# Patient Record
Sex: FEMALE | Race: BLACK OR AFRICAN AMERICAN | NOT HISPANIC OR LATINO | Employment: OTHER | URBAN - METROPOLITAN AREA
[De-identification: names, ages, dates, MRNs, and addresses within clinical notes are randomized per-mention and may not be internally consistent; named-entity substitution may affect disease eponyms.]

---

## 2018-02-26 RX ORDER — VALSARTAN AND HYDROCHLOROTHIAZIDE 320; 25 MG/1; MG/1
1 TABLET, FILM COATED ORAL DAILY
Qty: 30 TABLET | Refills: 0 | Status: SHIPPED | OUTPATIENT
Start: 2018-02-26 | End: 2019-02-26

## 2018-07-05 RX ORDER — PANTOPRAZOLE SODIUM 40 MG/1
TABLET, DELAYED RELEASE ORAL
Qty: 30 TABLET | Refills: 3 | Status: SHIPPED | OUTPATIENT
Start: 2018-07-05

## 2018-09-28 ENCOUNTER — OFFICE VISIT (OUTPATIENT)
Dept: HEMATOLOGY/ONCOLOGY | Facility: CLINIC | Age: 63
End: 2018-09-28
Payer: COMMERCIAL

## 2018-09-28 VITALS — WEIGHT: 293 LBS | BODY MASS INDEX: 44.41 KG/M2 | HEIGHT: 68 IN | RESPIRATION RATE: 18 BRPM | TEMPERATURE: 98 F

## 2018-09-28 DIAGNOSIS — I82.401 RECURRENT ACUTE DEEP VEIN THROMBOSIS (DVT) OF RIGHT LOWER EXTREMITY: ICD-10-CM

## 2018-09-28 DIAGNOSIS — D61.818 PANCYTOPENIA, ACQUIRED: ICD-10-CM

## 2018-09-28 DIAGNOSIS — I10 ESSENTIAL HYPERTENSION: ICD-10-CM

## 2018-09-28 DIAGNOSIS — D75.839 THROMBOCYTOSIS: ICD-10-CM

## 2018-09-28 DIAGNOSIS — D61.818 ACQUIRED PANCYTOPENIA: Primary | ICD-10-CM

## 2018-09-28 DIAGNOSIS — G63 POLYNEUROPATHY ASSOCIATED WITH UNDERLYING DISEASE: ICD-10-CM

## 2018-09-28 DIAGNOSIS — M54.41 CHRONIC BILATERAL LOW BACK PAIN WITH BILATERAL SCIATICA: ICD-10-CM

## 2018-09-28 DIAGNOSIS — M54.42 CHRONIC BILATERAL LOW BACK PAIN WITH BILATERAL SCIATICA: ICD-10-CM

## 2018-09-28 DIAGNOSIS — M79.7 FIBROMYALGIA SYNDROME: ICD-10-CM

## 2018-09-28 DIAGNOSIS — G89.29 CHRONIC BILATERAL LOW BACK PAIN WITH BILATERAL SCIATICA: ICD-10-CM

## 2018-09-28 DIAGNOSIS — D68.59 HYPERCOAGULATION SYNDROME: ICD-10-CM

## 2018-09-28 PROCEDURE — 3008F BODY MASS INDEX DOCD: CPT | Mod: ,,, | Performed by: INTERNAL MEDICINE

## 2018-09-28 PROCEDURE — 99204 OFFICE O/P NEW MOD 45 MIN: CPT | Mod: ,,, | Performed by: INTERNAL MEDICINE

## 2018-09-28 RX ORDER — MELOXICAM 15 MG/1
15 TABLET ORAL DAILY
Refills: 3 | COMMUNITY
Start: 2018-09-04

## 2018-09-28 RX ORDER — ACETAMINOPHEN, DEXTROMETHORPHAN HBR, DOXYLAMINE SUCCINATE, PHENYLEPHRINE HCL 650; 20; 12.5; 1 MG/30ML; MG/30ML; MG/30ML; MG/30ML
SOLUTION ORAL
COMMUNITY

## 2018-09-28 RX ORDER — METHOCARBAMOL 500 MG/1
1000 TABLET, FILM COATED ORAL
COMMUNITY
Start: 2018-05-10

## 2018-09-28 RX ORDER — FERROUS SULFATE 325(65) MG
325 TABLET ORAL
COMMUNITY

## 2018-09-28 RX ORDER — GABAPENTIN 400 MG/1
400 CAPSULE ORAL 3 TIMES DAILY
Refills: 5 | COMMUNITY
Start: 2018-09-26

## 2018-09-28 RX ORDER — FUROSEMIDE 20 MG/1
20 TABLET ORAL
COMMUNITY
Start: 2018-09-13 | End: 2019-09-13

## 2018-09-28 RX ORDER — HYDROCODONE BITARTRATE AND ACETAMINOPHEN 10; 325 MG/1; MG/1
1 TABLET ORAL
COMMUNITY

## 2018-09-28 RX ORDER — CARVEDILOL 12.5 MG/1
TABLET ORAL
COMMUNITY
Start: 2017-07-11

## 2018-09-28 RX ORDER — DULOXETIN HYDROCHLORIDE 60 MG/1
60 CAPSULE, DELAYED RELEASE ORAL
COMMUNITY
Start: 2018-08-23

## 2018-09-28 NOTE — LETTER
September 28, 2018      Merry Pena MD  [address] [place] [address]           [facility] - Hematology Oncology  [address] [facility]  [address] [place] [address]  Phone: [phone]  Fax: [phone]          Patient: Mitzy Almonte   MR Number: [id number]   YOB: 1955   Date of Visit: 9/28/2018       Dear Dr. Merry Pena:    Thank you for referring Mitzy Almonte to me for evaluation. Attached you will find relevant portions of my assessment and plan of care.    If you have questions, please do not hesitate to call me. I look forward to following Mitzy Almonte along with you.    Sincerely,    MANUEL Laurent MD    Enclosure  CC:  No Recipients    If you would like to receive this communication electronically, please contact [email or web address] or [phone] to request more information on [employer] Link access.    For providers and/or their staff who would like to refer a patient to [facility], please contact us through our one-stop-shop provider referral line, [facility] [place], at [phone].    If you feel you have received this communication in error or would no longer like to receive these types of communications, please e-mail [email or web address]

## 2018-09-29 PROBLEM — D61.818 PANCYTOPENIA, ACQUIRED: Status: ACTIVE | Noted: 2018-09-29

## 2018-09-30 NOTE — PROGRESS NOTES
[facility] History & Physical    Subjective:      Patient ID:   Mitzy Almonte  63 y.o. female  1955  [place], Kim Martines Lew, Yanez      Chief Complaint:   Anemia, pancytopenia    HPI:  63 y.o. female with anemia and pancytopenia.  No transfusion hx, liver or GB dx.  Anabelle Stevenson, sister, anemia.    Has had episodes of L inguinal pain and vaginal spotting.    Decreased appetite but weight increased.  No GI sx.  She has sleep apnea, but does not use CPAP.  Fibromyalgia +, no RA.  Spinal stenosis with sciatica down both legs, and L & R foot numbness.  Imbalance sx.    No surgery hx.  HTN +.    Menses age 17.  M0  Postmenopausal in early 50's.    Smoke no. ETOH no. Allergy no.  Job [employer] manager.    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  Dad colostomy.  Sister anemia    Sister CHF  Son diverticulitis    EGD, colonoscopy C 17.      ROS:   GEN: normal without any fever, night sweats or weight loss  HEENT: normal with no HA's, sore throat, stiff neck, changes in vision  CV: normal with no CP, SOB, PND, BARNES or orthopnea  PULM:See HPI  GI: normal with no abdominal pain, nausea, vomiting, constipation, diarrhea, melanotic stools, BRBPR, or hematemesis  : normal with no hematuria, dysuria  GYN: See HPI  BREAST: normal with no mass, discharge, pain  SKIN: normal with no rash, erythema, bruising, or swelling    Review of patient's allergies indicates:  No Known Allergies      Current Outpatient Medications:     aspirin-calcium carbonate 81 mg-300 mg calcium(777 mg) Tab, Take 81 mg by mouth., Disp: , Rfl:     carvedilol (COREG) 12.5 MG tablet, carvedilol 12.5 mg tablet  TAKE ONE TABLET BY MOUTH TWICE DAILY, Disp: , Rfl:     cyanocobalamin, vitamin B-12, 1,000 mcg TbSR, Take by mouth., Disp: , Rfl:     DULoxetine (CYMBALTA) 60 MG capsule, Take 60 mg by mouth., Disp: , Rfl:     ferrous sulfate (FEOSOL) 325 mg (65 mg iron) Tab tablet, Take 325 mg by mouth., Disp: , Rfl:     furosemide (LASIX) 20 MG tablet,  "Take 20 mg by mouth., Disp: , Rfl:     gabapentin (NEURONTIN) 400 MG capsule, Take 400 mg by mouth 3 (three) times daily., Disp: , Rfl: 5    glucosamine sulfate (SYNOVACIN ORAL), Glucosamine, Disp: , Rfl:     HYDROcodone-acetaminophen (NORCO)  mg per tablet, Take 1 tablet by mouth., Disp: , Rfl:     meloxicam (MOBIC) 15 MG tablet, Take 15 mg by mouth once daily., Disp: , Rfl: 3    methocarbamol (ROBAXIN) 500 MG Tab, Take 1,000 mg by mouth., Disp: , Rfl:     pantoprazole (PROTONIX) 40 MG tablet, TAKE ONE TABLET BY MOUTH EVERY DAY, Disp: 30 tablet, Rfl: 3    valsartan-hydrochlorothiazide (DIOVAN-HCT) 320-25 mg per tablet, Take 1 tablet by mouth once daily., Disp: 30 tablet, Rfl: 0          Objective:   Vitals:  Temperature 97.9 °F (36.6 °C), resp. rate 18, height 5' 8" (1.727 m), weight (!) 146.5 kg (323 lb).    Physical Examination:   GEN: no apparent distress, comfortable  HEAD: atraumatic and normocephalic  EYES: no pallor, no icterus  ENT:  no pharyngeal erythema, external ears WNL; no nasal discharge  NECK: no masses, thyroid normal, trachea midline, no LAD/LN's, supple  CV: RRR with no murmur; normal pulse; normal S1 and S2  CHEST: Normal respiratory effort; CTAB; normal breath sounds; no wheeze or crackles  ABDOM: nontender and nondistended; soft; normal bowel sounds; no rebound/guarding, L/S NP  MUSC/Skeletal: ROM normal; no crepitus; joints normal  EXTREM: no clubbing, cyanosis, inflammation or swelling  SKIN: no rashes, lesions, ulcers, petechiae   : no CVAT  NEURO: grossly intact; motor/sensory WNL;  no tremors  PSYCH: normal mood, affect and behavior  LYMPH: normal cervical, supraclavicular, axillary and groin LN's  BREASTS: L & R NT, no palpable mass      Labs:     H/H 10.3/32.2  WBC 3,600.  MCV 75  Creat 1.04  Plt cnt 118,000.  Folate 10.3  Ferritin 10  B    Assessment:   (1) 63 y.o. female with diagnosis of  Anemia and pancytopenia.  Lab ordered for [facility].  Check for " deficiencies of Fe, Vitamen, check for renal dx and myeloma.  Check for PNH.    (2)RTC 2-3 weeks, [place] office.           Follow-up in about 3 weeks (around 10/19/2018) for check of blood status after therapy.

## 2018-10-25 ENCOUNTER — OFFICE VISIT (OUTPATIENT)
Dept: HEMATOLOGY/ONCOLOGY | Facility: CLINIC | Age: 63
End: 2018-10-25
Payer: COMMERCIAL

## 2018-10-25 VITALS
BODY MASS INDEX: 43.4 KG/M2 | HEART RATE: 65 BPM | HEIGHT: 69 IN | WEIGHT: 293 LBS | DIASTOLIC BLOOD PRESSURE: 86 MMHG | SYSTOLIC BLOOD PRESSURE: 151 MMHG | TEMPERATURE: 98 F

## 2018-10-25 DIAGNOSIS — D50.9 MICROCYTIC NORMOCHROMIC ANEMIA: Primary | ICD-10-CM

## 2018-10-25 PROCEDURE — 3008F BODY MASS INDEX DOCD: CPT | Mod: ,,, | Performed by: INTERNAL MEDICINE

## 2018-10-25 PROCEDURE — 99213 OFFICE O/P EST LOW 20 MIN: CPT | Mod: ,,, | Performed by: INTERNAL MEDICINE

## 2018-10-25 NOTE — LETTER
October 27, 2018      Merry Pena MD  [address]  [place] [address]           [facility] Hematology Oncology  [address] [address]  [place] [address]  Phone: [phone]  Fax: [phone]          Patient: Mitzy Almonte   MR Number: <[id number]>   YOB: 1955   Date of Visit: 10/25/2018       Dear Dr. Merry Pena:    Thank you for referring Mitzy Almonte to me for evaluation. Attached you will find relevant portions of my assessment and plan of care.    If you have questions, please do not hesitate to call me. I look forward to following Mitzy Almonte along with you.    Sincerely,        Enclosure  CC:  No Recipients    If you would like to receive this communication electronically, please contact [email or web address] or [phone] to request more information on [employer] Link access.    For providers and/or their staff who would like to refer a patient to [facility], please contact us through our one-stop-shop provider referral line, [facility], at [phone].    If you feel you have received this communication in error or would no longer like to receive these types of communications, please e-mail [email or web address]

## 2018-10-25 NOTE — PATIENT INSTRUCTIONS

## 2018-10-29 NOTE — PROGRESS NOTES
[facility] History & Physical    Subjective:      Patient ID:   Mitzy Almonte  63 y.o. female  1955  [place], Kim Martines Lew, Yanez      Chief Complaint:   Anemia, pancytopenia    HPI:  63 y.o. female with anemia and pancytopenia.  No transfusion hx, liver or GB dx.  Anabelle Stevenson, sister, anemia.    Has had episodes of L inguinal pain and vaginal spotting.    Decreased appetite but weight increased.  No GI sx.  She has sleep apnea, but does not use CPAP.  Fibromyalgia +, no RA.  Spinal stenosis with sciatica down both legs, and L & R foot numbness.  Imbalance sx.    No surgery hx.  HTN +.    Menses age 17.  M0  Postmenopausal in early 50's.    Smoke no. ETOH no. Allergy no.  Job [employer] manager.    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  Dad colostomy.  Sister anemia    Sister CHF  Son diverticulitis    EGD, colonoscopy C 17.      ROS:   GEN: normal without any fever, night sweats or weight loss  HEENT: normal with no HA's, sore throat, stiff neck, changes in vision  CV: normal with no CP, SOB, PND, BARNES or orthopnea  PULM:See HPI  GI: normal with no abdominal pain, nausea, vomiting, constipation, diarrhea, melanotic stools, BRBPR, or hematemesis  : normal with no hematuria, dysuria  GYN: See HPI  BREAST: normal with no mass, discharge, pain  SKIN: normal with no rash, erythema, bruising, or swelling    Review of patient's allergies indicates:  No Known Allergies      Current Outpatient Medications:     aspirin-calcium carbonate 81 mg-300 mg calcium(777 mg) Tab, Take 81 mg by mouth., Disp: , Rfl:     carvedilol (COREG) 12.5 MG tablet, carvedilol 12.5 mg tablet  TAKE ONE TABLET BY MOUTH TWICE DAILY, Disp: , Rfl:     cyanocobalamin, vitamin B-12, 1,000 mcg TbSR, Take by mouth., Disp: , Rfl:     DULoxetine (CYMBALTA) 60 MG capsule, Take 60 mg by mouth., Disp: , Rfl:     ferrous sulfate (FEOSOL) 325 mg (65 mg iron) Tab tablet, Take 325 mg by mouth., Disp: , Rfl:     furosemide (LASIX) 20 MG tablet,  "Take 20 mg by mouth., Disp: , Rfl:     gabapentin (NEURONTIN) 400 MG capsule, Take 400 mg by mouth 3 (three) times daily., Disp: , Rfl: 5    glucosamine sulfate (SYNOVACIN ORAL), Glucosamine, Disp: , Rfl:     HYDROcodone-acetaminophen (NORCO)  mg per tablet, Take 1 tablet by mouth., Disp: , Rfl:     meloxicam (MOBIC) 15 MG tablet, Take 15 mg by mouth once daily., Disp: , Rfl: 3    methocarbamol (ROBAXIN) 500 MG Tab, Take 1,000 mg by mouth., Disp: , Rfl:     pantoprazole (PROTONIX) 40 MG tablet, TAKE ONE TABLET BY MOUTH EVERY DAY, Disp: 30 tablet, Rfl: 3    valsartan-hydrochlorothiazide (DIOVAN-HCT) 320-25 mg per tablet, Take 1 tablet by mouth once daily., Disp: 30 tablet, Rfl: 0          Objective:   Vitals:  Blood pressure (!) 151/86, pulse 65, temperature 97.6 °F (36.4 °C), height 5' 9" (1.753 m), weight (!) 147.4 kg (325 lb).    Physical Examination:   GEN: no apparent distress, comfortable  HEAD: atraumatic and normocephalic  EYES: no pallor, no icterus  ENT:  no pharyngeal erythema, external ears WNL; no nasal discharge  NECK: no masses, thyroid normal, trachea midline, no LAD/LN's, supple  CV: RRR with no murmur; normal pulse; normal S1 and S2  CHEST: Normal respiratory effort; CTAB; normal breath sounds; no wheeze or crackles  ABDOM: nontender and nondistended; soft; normal bowel sounds; no rebound/guarding, L/S NP  MUSC/Skeletal: ROM normal; no crepitus; joints normal  EXTREM: no clubbing, cyanosis, inflammation or swelling  SKIN: no rashes, lesions, ulcers, petechiae   : no CVAT  NEURO: grossly intact; motor/sensory WNL;  no tremors  PSYCH: normal mood, affect and behavior  LYMPH: normal cervical, supraclavicular, axillary and groin LN's  BREASTS: L & R NT, no palpable mass      Labs:     H/H 10.3/32.2  WBC 3,600.  MCV 75  Creat 1.04  Plt cnt 118,000.  Folate 10.3  Ferritin 10  B    Assessment:   (1) 63 y.o. female with diagnosis of  Anemia and pancytopenia.  Lab ordered [facility].  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  Hgb10.5.  Start procrit if HGB < 10.    (2)RTC 6 months, with CBC   [place] office.           Follow-up in about 6 months (around 4/25/2019) for check of blood status after therapy.

## 2018-11-05 RX ORDER — CARVEDILOL 12.5 MG/1
TABLET ORAL
Qty: 180 TABLET | OUTPATIENT
Start: 2018-11-05

## 2018-12-10 RX ORDER — CARVEDILOL 12.5 MG/1
TABLET ORAL
Qty: 180 TABLET | OUTPATIENT
Start: 2018-12-10

## 2019-04-25 ENCOUNTER — OFFICE VISIT (OUTPATIENT)
Dept: HEMATOLOGY/ONCOLOGY | Facility: CLINIC | Age: 64
End: 2019-04-25
Payer: COMMERCIAL

## 2019-04-25 VITALS
DIASTOLIC BLOOD PRESSURE: 80 MMHG | HEART RATE: 61 BPM | BODY MASS INDEX: 47.85 KG/M2 | RESPIRATION RATE: 20 BRPM | WEIGHT: 293 LBS | TEMPERATURE: 98 F | SYSTOLIC BLOOD PRESSURE: 159 MMHG

## 2019-04-25 DIAGNOSIS — D50.9 MICROCYTIC NORMOCHROMIC ANEMIA: Primary | ICD-10-CM

## 2019-04-25 PROCEDURE — 3008F PR BODY MASS INDEX (BMI) DOCUMENTED: ICD-10-PCS | Mod: ,,, | Performed by: INTERNAL MEDICINE

## 2019-04-25 PROCEDURE — 3079F PR MOST RECENT DIASTOLIC BLOOD PRESSURE 80-89 MM HG: ICD-10-PCS | Mod: ,,, | Performed by: INTERNAL MEDICINE

## 2019-04-25 PROCEDURE — 3077F SYST BP >= 140 MM HG: CPT | Mod: ,,, | Performed by: INTERNAL MEDICINE

## 2019-04-25 PROCEDURE — 3077F PR MOST RECENT SYSTOLIC BLOOD PRESSURE >= 140 MM HG: ICD-10-PCS | Mod: ,,, | Performed by: INTERNAL MEDICINE

## 2019-04-25 PROCEDURE — 3079F DIAST BP 80-89 MM HG: CPT | Mod: ,,, | Performed by: INTERNAL MEDICINE

## 2019-04-25 PROCEDURE — 99214 OFFICE O/P EST MOD 30 MIN: CPT | Mod: ,,, | Performed by: INTERNAL MEDICINE

## 2019-04-25 PROCEDURE — 3008F BODY MASS INDEX DOCD: CPT | Mod: ,,, | Performed by: INTERNAL MEDICINE

## 2019-04-25 PROCEDURE — 99214 PR OFFICE/OUTPT VISIT, EST, LEVL IV, 30-39 MIN: ICD-10-PCS | Mod: ,,, | Performed by: INTERNAL MEDICINE

## 2019-04-25 NOTE — LETTER
April 27, 2019      Merry Pena MD  [address]  [place] [address]           [facility]  [address]  [place] [address]  Phone: [phone]  Fax: [phone]          Patient: Mitzy Almonte   MR Number: [id number]   YOB: 1955   Date of Visit: 4/25/2019       Dear Dr. Merry Pena:    Thank you for referring Mitzy Almonte to me for evaluation. Attached you will find relevant portions of my assessment and plan of care.    If you have questions, please do not hesitate to call me. I look forward to following Mitzy Almonte along with you.    Sincerely,    MANUEL Laurent MD    Enclosure  CC:  No Recipients    If you would like to receive this communication electronically, please contact [email or web address] or [phone] to request more information on [employer] Link access.    For providers and/or their staff who would like to refer a patient to [facility], please contact us through our one-stop-shop provider referral line, [facility] [place], at [phone].    If you feel you have received this communication in error or would no longer like to receive these types of communications, please e-mail [email or web address]

## 2019-04-28 NOTE — PROGRESS NOTES
[facility] History & Physical    Subjective:      Patient ID:   Mitzy Almonte  64 y.o. female  1955  [place], Kim Martines Lew, Yanez      Chief Complaint:   Anemia, pancytopenia    HPI:  64 y.o. female with anemia and pancytopenia.  No transfusion hx, liver or GB dx.  Anabelle Stevenson, sister, anemia.    Has had episodes of L inguinal pain and vaginal spotting.    Decreased appetite but weight increased.  No GI sx.  She has sleep apnea, but does not use CPAP.  Fibromyalgia +, no RA.  Spinal stenosis with sciatica down both legs, and L & R foot numbness.  Imbalance sx.    No surgery hx.  HTN +.    Menses age 17.  M0  Postmenopausal in early 50's.    Smoke no. ETOH no. Allergy no.  Job [employer] manager.    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  Dad colostomy.  Sister anemia    Sister CHF  Son diverticulitis    EGD, colonoscopy C 17.      ROS:   GEN: normal without any fever, night sweats or weight loss  HEENT: normal with no HA's, sore throat, stiff neck, changes in vision  CV: normal with no CP, SOB, PND, BARNES or orthopnea  PULM:See HPI  GI: normal with no abdominal pain, nausea, vomiting, constipation, diarrhea, melanotic stools, BRBPR, or hematemesis  : normal with no hematuria, dysuria  GYN: See HPI  BREAST: normal with no mass, discharge, pain  SKIN: normal with no rash, erythema, bruising, or swelling    Review of patient's allergies indicates:  No Known Allergies      Current Outpatient Medications:     aspirin-calcium carbonate 81 mg-300 mg calcium(777 mg) Tab, Take 81 mg by mouth., Disp: , Rfl:     carvedilol (COREG) 12.5 MG tablet, carvedilol 12.5 mg tablet  TAKE ONE TABLET BY MOUTH TWICE DAILY, Disp: , Rfl:     cyanocobalamin, vitamin B-12, 1,000 mcg TbSR, Take by mouth., Disp: , Rfl:     DULoxetine (CYMBALTA) 60 MG capsule, Take 60 mg by mouth., Disp: , Rfl:     ferrous sulfate (FEOSOL) 325 mg (65 mg iron) Tab tablet, Take 325 mg by mouth., Disp: , Rfl:     furosemide (LASIX) 20 MG tablet,  Take 20 mg by mouth., Disp: , Rfl:     gabapentin (NEURONTIN) 400 MG capsule, Take 400 mg by mouth 3 (three) times daily., Disp: , Rfl: 5    glucosamine sulfate (SYNOVACIN ORAL), Glucosamine, Disp: , Rfl:     HYDROcodone-acetaminophen (NORCO)  mg per tablet, Take 1 tablet by mouth., Disp: , Rfl:     meloxicam (MOBIC) 15 MG tablet, Take 15 mg by mouth once daily., Disp: , Rfl: 3    methocarbamol (ROBAXIN) 500 MG Tab, Take 1,000 mg by mouth., Disp: , Rfl:     pantoprazole (PROTONIX) 40 MG tablet, TAKE ONE TABLET BY MOUTH EVERY DAY, Disp: 30 tablet, Rfl: 3    valsartan-hydrochlorothiazide (DIOVAN-HCT) 320-25 mg per tablet, Take 1 tablet by mouth once daily., Disp: 30 tablet, Rfl: 0          Objective:   Vitals:  Blood pressure (!) 159/80, pulse 61, temperature 98 °F (36.7 °C), resp. rate 20, weight (!) 147 kg (324 lb).    Physical Examination:   GEN: no apparent distress, comfortable  HEAD: atraumatic and normocephalic  EYES: no pallor, no icterus  ENT:  no pharyngeal erythema, external ears WNL; no nasal discharge  NECK: no masses, thyroid normal, trachea midline, no LAD/LN's, supple  CV: RRR with no murmur; normal pulse; normal S1 and S2  CHEST: Normal respiratory effort; CTAB; normal breath sounds; no wheeze or crackles  ABDOM: nontender and nondistended; soft, no rebound/guarding, L/S NP  MUSC/Skeletal: ROM normal; no crepitus; joints normal  EXTREM: no clubbing, cyanosis, inflammation or swelling  SKIN: no rashes, lesions, ulcers, petechiae   : no CVAT  NEURO: grossly intact; motor/sensory WNL;  no tremors  PSYCH: normal mood, affect and behavior  LYMPH: normal cervical, supraclavicular, axillary and groin LN's  BREASTS: L & R NT, no palpable mass      Labs:     H/H 9.8, MCV 77.      Assessment:   (1) 64 y.o. female with diagnosis of  Anemia and pancytopenia.    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  Hgb10.5.  Start procrit if HGB < 10.    (2)RTC 4 months, with CBC   [place] office.

## 2019-08-15 ENCOUNTER — OFFICE VISIT (OUTPATIENT)
Dept: HEMATOLOGY/ONCOLOGY | Facility: CLINIC | Age: 64
End: 2019-08-15
Payer: COMMERCIAL

## 2019-08-15 VITALS
OXYGEN SATURATION: 97 % | HEART RATE: 67 BPM | DIASTOLIC BLOOD PRESSURE: 76 MMHG | SYSTOLIC BLOOD PRESSURE: 125 MMHG | TEMPERATURE: 98 F

## 2019-08-15 DIAGNOSIS — G89.29 CHRONIC BILATERAL LOW BACK PAIN WITH BILATERAL SCIATICA: ICD-10-CM

## 2019-08-15 DIAGNOSIS — D61.818 PANCYTOPENIA, ACQUIRED: Primary | ICD-10-CM

## 2019-08-15 DIAGNOSIS — G63 POLYNEUROPATHY ASSOCIATED WITH UNDERLYING DISEASE: ICD-10-CM

## 2019-08-15 DIAGNOSIS — M79.7 FIBROMYALGIA SYNDROME: ICD-10-CM

## 2019-08-15 DIAGNOSIS — M54.42 CHRONIC BILATERAL LOW BACK PAIN WITH BILATERAL SCIATICA: ICD-10-CM

## 2019-08-15 DIAGNOSIS — M54.41 CHRONIC BILATERAL LOW BACK PAIN WITH BILATERAL SCIATICA: ICD-10-CM

## 2019-08-15 DIAGNOSIS — D50.9 MICROCYTIC NORMOCHROMIC ANEMIA: ICD-10-CM

## 2019-08-15 PROCEDURE — 99214 OFFICE O/P EST MOD 30 MIN: CPT | Mod: S$GLB,,, | Performed by: INTERNAL MEDICINE

## 2019-08-15 PROCEDURE — 99214 PR OFFICE/OUTPT VISIT, EST, LEVL IV, 30-39 MIN: ICD-10-PCS | Mod: S$GLB,,, | Performed by: INTERNAL MEDICINE

## 2019-08-20 NOTE — PROGRESS NOTES
[facility] History & Physical    Subjective:      Patient ID:   Mitzy Almotne  64 y.o. female  1955  [place], Kim Martines Lew, [place]      Chief Complaint:   Anemia, pancytopenia    HPI:  64 y.o. female with anemia and pancytopenia.  No transfusion hx, liver or GB dx.  Anabelle Stevenson, sister, anemia.    Has had episodes of L inguinal pain and vaginal spotting.    Decreased appetite but weight increased.  No GI sx.  She has sleep apnea, but does not use CPAP.  Fibromyalgia +, no RA.  Spinal stenosis with sciatica down both legs, and L & R foot numbness.  Imbalance sx.    No surgery hx.  HTN +.    Menses age 17.  M0  Postmenopausal in early 50's.  Admits to frequency, incontinence.    Smoke no. ETOH no. Allergy no.  Job [employer] manager.    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  Dad colostomy.  Sister anemia    Sister CHF  Son diverticulitis    EGD, colonoscopy C 17.      ROS:   GEN: normal without any fever, night sweats or weight loss  HEENT: normal with no HA's, sore throat, stiff neck, changes in vision  CV: normal with no CP, SOB, PND, BARNES or orthopnea  PULM:See HPI  GI: normal with no abdominal pain, nausea, vomiting, constipation, diarrhea, melanotic stools, BRBPR, or hematemesis  : normal with no hematuria, dysuria  GYN: See HPI  BREAST: normal with no mass, discharge, pain  SKIN: normal with no rash, erythema, bruising, or swelling    Review of patient's allergies indicates:  No Known Allergies      Current Outpatient Medications:     aspirin-calcium carbonate 81 mg-300 mg calcium(777 mg) Tab, Take 81 mg by mouth., Disp: , Rfl:     carvedilol (COREG) 12.5 MG tablet, carvedilol 12.5 mg tablet  TAKE ONE TABLET BY MOUTH TWICE DAILY, Disp: , Rfl:     cyanocobalamin, vitamin B-12, 1,000 mcg TbSR, Take by mouth., Disp: , Rfl:     DULoxetine (CYMBALTA) 60 MG capsule, Take 60 mg by mouth., Disp: , Rfl:     ferrous sulfate (FEOSOL) 325 mg (65 mg iron) Tab tablet, Take 325 mg by mouth., Disp: , Rfl:      furosemide (LASIX) 20 MG tablet, Take 20 mg by mouth., Disp: , Rfl:     gabapentin (NEURONTIN) 400 MG capsule, Take 400 mg by mouth 3 (three) times daily., Disp: , Rfl: 5    glucosamine sulfate (SYNOVACIN ORAL), Glucosamine, Disp: , Rfl:     HYDROcodone-acetaminophen (NORCO)  mg per tablet, Take 1 tablet by mouth., Disp: , Rfl:     meloxicam (MOBIC) 15 MG tablet, Take 15 mg by mouth once daily., Disp: , Rfl: 3    methocarbamol (ROBAXIN) 500 MG Tab, Take 1,000 mg by mouth., Disp: , Rfl:     pantoprazole (PROTONIX) 40 MG tablet, TAKE ONE TABLET BY MOUTH EVERY DAY, Disp: 30 tablet, Rfl: 3    valsartan-hydrochlorothiazide (DIOVAN-HCT) 320-25 mg per tablet, Take 1 tablet by mouth once daily., Disp: 30 tablet, Rfl: 0          Objective:   Vitals:  Blood pressure 125/76, pulse 67, temperature 97.9 °F (36.6 °C), SpO2 97 %.    Physical Examination:   GEN: no apparent distress, comfortable  HEAD: atraumatic and normocephalic  EYES: no pallor, no icterus  ENT:  no pharyngeal erythema, external ears WNL; no nasal discharge  NECK: no masses, thyroid normal, trachea midline, no LAD/LN's, supple  CV: RRR with no murmur; normal pulse; normal S1 and S2  CHEST: Normal respiratory effort; CTAB; normal breath sounds; no wheeze or crackles  ABDOM: nontender and nondistended; soft, no rebound/guarding, L/S NP  MUSC/Skeletal: ROM normal; no crepitus; joints normal  EXTREM: no clubbing, cyanosis, inflammation or swelling  SKIN: no rashes, lesions, ulcers, petechiae   : no CVAT  NEURO: grossly intact; motor/sensory WNL;  no tremors  PSYCH: normal mood, affect and behavior  LYMPH: normal cervical, supraclavicular, axillary and groin LN's  BREASTS: L & R NT, no palpable mass      Labs:     H/H 10/32.  WBC  4,100.  plt cnt 133,000.  MCV 75.  B 12, folate, B6, ferritin NL.  Epo 12.      Assessment:   (1) 64 y.o. female with diagnosis of  Anemia and pancytopenia.    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  Hgb10.5.  Start procrit if  HGB < 10.    (2)RTC 4 months, with CBC   [place] office.

## 2019-12-19 ENCOUNTER — OFFICE VISIT (OUTPATIENT)
Dept: HEMATOLOGY/ONCOLOGY | Facility: CLINIC | Age: 64
End: 2019-12-19
Payer: MEDICARE

## 2019-12-19 VITALS
BODY MASS INDEX: 45.19 KG/M2 | TEMPERATURE: 98 F | WEIGHT: 293 LBS | DIASTOLIC BLOOD PRESSURE: 81 MMHG | SYSTOLIC BLOOD PRESSURE: 129 MMHG | RESPIRATION RATE: 20 BRPM | HEART RATE: 70 BPM

## 2019-12-19 DIAGNOSIS — N18.30 ANEMIA ASSOCIATED WITH STAGE 3 CHRONIC RENAL FAILURE: Primary | ICD-10-CM

## 2019-12-19 DIAGNOSIS — D63.1 ANEMIA ASSOCIATED WITH STAGE 3 CHRONIC RENAL FAILURE: Primary | ICD-10-CM

## 2019-12-19 PROCEDURE — 99214 PR OFFICE/OUTPT VISIT, EST, LEVL IV, 30-39 MIN: ICD-10-PCS | Mod: S$GLB,,, | Performed by: INTERNAL MEDICINE

## 2019-12-19 PROCEDURE — 99214 OFFICE O/P EST MOD 30 MIN: CPT | Mod: S$GLB,,, | Performed by: INTERNAL MEDICINE

## 2019-12-20 NOTE — PROGRESS NOTES
[facility] History & Physical    Subjective:      Patient ID:   Mitzy Almonte  64 y.o. female  1955  [place], Kim Martines, Salo Sloan      Chief Complaint:   Anemia, pancytopenia    HPI:  64 y.o. female with anemia and pancytopenia.  No transfusion hx, liver or GB dx.  Energy 6/10.  Anabelle Stevenson, sister, anemia.    Has had episodes of L inguinal pain and vaginal spotting.    Decreased appetite but weight increased.  No GI sx.  She has sleep apnea, but does not use CPAP.  Fibromyalgia +, no RA.  Spinal stenosis with sciatica down both legs, and L & R foot numbness.  Imbalance sx.    No surgery hx.  HTN +.    Menses age 17.  M0  Postmenopausal in early 50's.  Admits to frequency, incontinence.    Smoke no. ETOH no. Allergy no.  Job [employer] manager.    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  Dad colostomy.  Sister anemia    Sister CHF  Son diverticulitis    EGD, colonoscopy [facility] 17.      ROS:   GEN: normal without any fever, night sweats or weight loss  HEENT: normal with no HA's, sore throat, stiff neck, changes in vision  CV: normal with no CP, SOB, PND, BARNES or orthopnea  PULM:See HPI  GI: normal with no abdominal pain, nausea, vomiting, constipation, diarrhea, melanotic stools, BRBPR, or hematemesis  : normal with no hematuria, dysuria  GYN: See HPI  BREAST: normal with no mass, discharge, pain  SKIN: normal with no rash, erythema, bruising, or swelling    Review of patient's allergies indicates:  No Known Allergies      Current Outpatient Medications:     aspirin-calcium carbonate 81 mg-300 mg calcium(777 mg) Tab, Take 81 mg by mouth., Disp: , Rfl:     carvedilol (COREG) 12.5 MG tablet, carvedilol 12.5 mg tablet  TAKE ONE TABLET BY MOUTH TWICE DAILY, Disp: , Rfl:     cyanocobalamin, vitamin B-12, 1,000 mcg TbSR, Take by mouth., Disp: , Rfl:     DULoxetine (CYMBALTA) 60 MG capsule, Take 60 mg by mouth., Disp: , Rfl:     ferrous sulfate (FEOSOL) 325 mg (65 mg iron) Tab tablet, Take 325 mg by mouth.,  Disp: , Rfl:     gabapentin (NEURONTIN) 400 MG capsule, Take 400 mg by mouth 3 (three) times daily., Disp: , Rfl: 5    glucosamine sulfate (SYNOVACIN ORAL), Glucosamine, Disp: , Rfl:     HYDROcodone-acetaminophen (NORCO)  mg per tablet, Take 1 tablet by mouth., Disp: , Rfl:     meloxicam (MOBIC) 15 MG tablet, Take 15 mg by mouth once daily., Disp: , Rfl: 3    methocarbamol (ROBAXIN) 500 MG Tab, Take 1,000 mg by mouth., Disp: , Rfl:     pantoprazole (PROTONIX) 40 MG tablet, TAKE ONE TABLET BY MOUTH EVERY DAY, Disp: 30 tablet, Rfl: 3    furosemide (LASIX) 20 MG tablet, Take 20 mg by mouth., Disp: , Rfl:     valsartan-hydrochlorothiazide (DIOVAN-HCT) 320-25 mg per tablet, Take 1 tablet by mouth once daily., Disp: 30 tablet, Rfl: 0          Objective:   Vitals:  Blood pressure 129/81, pulse 70, temperature 97.7 °F (36.5 °C), temperature source Oral, resp. rate 20, weight (!) 138.8 kg (306 lb).    Physical Examination:   GEN: no apparent distress, comfortable  HEAD: atraumatic and normocephalic  EYES: no pallor, no icterus  ENT:  no pharyngeal erythema, external ears WNL; no nasal discharge  NECK: no masses, thyroid normal, trachea midline, no LAD/LN's, supple  CV: RRR with no murmur; normal pulse; normal S1 and S2  CHEST: Normal respiratory effort; CTAB; normal breath sounds; no wheeze or crackles  ABDOM: nontender and nondistended; soft, no rebound/guarding, L/S NP  MUSC/Skeletal: ROM normal; no crepitus; joints normal  EXTREM: no clubbing, cyanosis, inflammation or swelling  SKIN: no rashes, lesions, ulcers, petechiae   : no CVAT  NEURO: grossly intact; motor/sensory WNL;  no tremors  PSYCH: normal mood, affect and behavior  LYMPH: normal cervical, supraclavicular, axillary and groin LN's  BREASTS: L & R NT, no palpable mass      Labs:     Hgb 10.1, creat 1.56  Re check lab now .    Assessment:   (1) 64 y.o. female with diagnosis of  Anemia and pancytopenia.    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   Hgb10.1.  Start procrit if HGB < 10.    (2)RTC 4 months, with [facility]   [place] office.

## 2020-01-01 ENCOUNTER — TELEPHONE (OUTPATIENT)
Dept: HEMATOLOGY/ONCOLOGY | Facility: CLINIC | Age: 65
End: 2020-01-01

## 2020-01-01 ENCOUNTER — OFFICE VISIT (OUTPATIENT)
Dept: HEMATOLOGY/ONCOLOGY | Facility: CLINIC | Age: 65
End: 2020-01-01
Payer: MEDICARE

## 2020-01-01 VITALS
WEIGHT: 293 LBS | RESPIRATION RATE: 19 BRPM | SYSTOLIC BLOOD PRESSURE: 170 MMHG | HEART RATE: 64 BPM | BODY MASS INDEX: 46.78 KG/M2 | TEMPERATURE: 98 F | DIASTOLIC BLOOD PRESSURE: 93 MMHG

## 2020-01-01 DIAGNOSIS — D63.1 ANEMIA ASSOCIATED WITH STAGE 3 CHRONIC RENAL FAILURE: Primary | ICD-10-CM

## 2020-01-01 DIAGNOSIS — D63.1 ANEMIA IN STAGE 3A CHRONIC KIDNEY DISEASE: Primary | ICD-10-CM

## 2020-01-01 DIAGNOSIS — D63.1 ANEMIA IN STAGE 3A CHRONIC KIDNEY DISEASE: ICD-10-CM

## 2020-01-01 DIAGNOSIS — N18.31 ANEMIA IN STAGE 3A CHRONIC KIDNEY DISEASE: Primary | ICD-10-CM

## 2020-01-01 DIAGNOSIS — N18.31 ANEMIA IN STAGE 3A CHRONIC KIDNEY DISEASE: ICD-10-CM

## 2020-01-01 DIAGNOSIS — N18.30 ANEMIA ASSOCIATED WITH STAGE 3 CHRONIC RENAL FAILURE: Primary | ICD-10-CM

## 2020-01-01 PROCEDURE — 99214 PR OFFICE/OUTPT VISIT, EST, LEVL IV, 30-39 MIN: ICD-10-PCS | Mod: S$GLB,,, | Performed by: INTERNAL MEDICINE

## 2020-01-01 PROCEDURE — 99214 OFFICE O/P EST MOD 30 MIN: CPT | Mod: S$GLB,,, | Performed by: INTERNAL MEDICINE

## 2020-04-13 ENCOUNTER — TELEPHONE (OUTPATIENT)
Dept: HEMATOLOGY/ONCOLOGY | Facility: CLINIC | Age: 65
End: 2020-04-13

## 2020-04-13 DIAGNOSIS — D63.1 ANEMIA ASSOCIATED WITH STAGE 3 CHRONIC RENAL FAILURE: Primary | ICD-10-CM

## 2020-04-13 DIAGNOSIS — N18.30 ANEMIA ASSOCIATED WITH STAGE 3 CHRONIC RENAL FAILURE: Primary | ICD-10-CM

## 2020-04-22 ENCOUNTER — TELEPHONE (OUTPATIENT)
Dept: HEMATOLOGY/ONCOLOGY | Facility: CLINIC | Age: 65
End: 2020-04-22

## 2020-04-22 NOTE — TELEPHONE ENCOUNTER
Called the pt change her 04/23 appointment @12:00 to a virtual visit. She does not have a smart phone or tablet. She can do a virtual audio.

## 2020-04-23 ENCOUNTER — TELEPHONE (OUTPATIENT)
Dept: HEMATOLOGY/ONCOLOGY | Facility: CLINIC | Age: 65
End: 2020-04-23

## 2020-04-23 ENCOUNTER — OFFICE VISIT (OUTPATIENT)
Dept: HEMATOLOGY/ONCOLOGY | Facility: CLINIC | Age: 65
End: 2020-04-23
Payer: MEDICARE

## 2020-04-23 DIAGNOSIS — G63 POLYNEUROPATHY ASSOCIATED WITH UNDERLYING DISEASE: ICD-10-CM

## 2020-04-23 DIAGNOSIS — D50.9 MICROCYTIC NORMOCHROMIC ANEMIA: Primary | ICD-10-CM

## 2020-04-23 DIAGNOSIS — M79.7 FIBROMYALGIA SYNDROME: ICD-10-CM

## 2020-04-23 DIAGNOSIS — M54.41 CHRONIC BILATERAL LOW BACK PAIN WITH BILATERAL SCIATICA: ICD-10-CM

## 2020-04-23 DIAGNOSIS — D63.1 ANEMIA ASSOCIATED WITH STAGE 3 CHRONIC RENAL FAILURE: Primary | ICD-10-CM

## 2020-04-23 DIAGNOSIS — D61.818 PANCYTOPENIA, ACQUIRED: ICD-10-CM

## 2020-04-23 DIAGNOSIS — D50.9 MICROCYTIC NORMOCHROMIC ANEMIA: ICD-10-CM

## 2020-04-23 DIAGNOSIS — G89.29 CHRONIC BILATERAL LOW BACK PAIN WITH BILATERAL SCIATICA: ICD-10-CM

## 2020-04-23 DIAGNOSIS — M54.42 CHRONIC BILATERAL LOW BACK PAIN WITH BILATERAL SCIATICA: ICD-10-CM

## 2020-04-23 DIAGNOSIS — N18.30 ANEMIA ASSOCIATED WITH STAGE 3 CHRONIC RENAL FAILURE: Primary | ICD-10-CM

## 2020-04-23 PROCEDURE — 99441 PR PHYSICIAN TELEPHONE EVALUATION 5-10 MIN: CPT | Mod: 95,,, | Performed by: INTERNAL MEDICINE

## 2020-04-23 PROCEDURE — 99441 PR PHYSICIAN TELEPHONE EVALUATION 5-10 MIN: ICD-10-PCS | Mod: 95,,, | Performed by: INTERNAL MEDICINE

## 2020-04-24 NOTE — PROGRESS NOTES
[facility] TELEMEDICINE AUDIO PHONE VISIT PROGRESS NOTE  2020    SHE IS IN ISOLATION AT HOME, BECAUSE OF CORONA VIRUS EPIDEMIC.    THIS IS A AUDIO PHONE ENCOUNTER, IN LIEU OF A IN PERSON ENCOUNTER BECAUSE OF CORONA VIRUS EMERGENCY.  PT ACKNOWLEDGED AND CONSENTED TO PHONE AUDIO ENCOUNTER TODAY AS PART OF HER OUT PT CARE.        Subjective:      Patient ID:   Mitzy Almonte  65 y.o. female  1955  [place], Kim Martines Lew, Yanez      Chief Complaint:   Anemia, pancytopenia    HPI:  65 y.o. female with anemia and pancytopenia.  No transfusion hx, liver or GB dx.  Energy 6/10.  Anabelle Stevenson, sister, anemia.    Has had episodes of L inguinal pain and vaginal spotting.    Decreased appetite but weight increased.  No GI sx.  She has sleep apnea, but does not use CPAP.  Fibromyalgia +, no RA.  Spinal stenosis with sciatica down both legs, and L & R foot numbness.  Imbalance sx.    No surgery hx.  HTN +.    Menses age 17.  M0  Postmenopausal in early 50's.  Admits to frequency, incontinence.    Smoke no. ETOH no. Allergy no.  Job [employer] manager.    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  Dad colostomy.  Sister anemia    Sister CHF  Son diverticulitis    EGD, colonoscopy [facility] 17.      ROS:   GEN: normal without any fever, night sweats or weight loss  HEENT: normal with no HA's, sore throat, stiff neck, changes in vision  CV: normal with no CP, SOB, PND, BARNES or orthopnea  PULM:See HPI  GI: normal with no abdominal pain, nausea, vomiting, constipation, diarrhea, melanotic stools, BRBPR, or hematemesis  : normal with no hematuria, dysuria  GYN: See HPI  BREAST: normal with no mass, discharge, pain  SKIN: normal with no rash, erythema, bruising, or swelling    Review of patient's allergies indicates:  No Known Allergies      Current Outpatient Medications:     aspirin-calcium carbonate 81 mg-300 mg calcium(777 mg) Tab, Take 81 mg by mouth., Disp: , Rfl:     carvedilol (COREG) 12.5 MG tablet, carvedilol 12.5 mg  tablet  TAKE ONE TABLET BY MOUTH TWICE DAILY, Disp: , Rfl:     cyanocobalamin, vitamin B-12, 1,000 mcg TbSR, Take by mouth., Disp: , Rfl:     DULoxetine (CYMBALTA) 60 MG capsule, Take 60 mg by mouth., Disp: , Rfl:     ferrous sulfate (FEOSOL) 325 mg (65 mg iron) Tab tablet, Take 325 mg by mouth., Disp: , Rfl:     furosemide (LASIX) 20 MG tablet, Take 20 mg by mouth., Disp: , Rfl:     gabapentin (NEURONTIN) 400 MG capsule, Take 400 mg by mouth 3 (three) times daily., Disp: , Rfl: 5    glucosamine sulfate (SYNOVACIN ORAL), Glucosamine, Disp: , Rfl:     HYDROcodone-acetaminophen (NORCO)  mg per tablet, Take 1 tablet by mouth., Disp: , Rfl:     meloxicam (MOBIC) 15 MG tablet, Take 15 mg by mouth once daily., Disp: , Rfl: 3    methocarbamol (ROBAXIN) 500 MG Tab, Take 1,000 mg by mouth., Disp: , Rfl:     pantoprazole (PROTONIX) 40 MG tablet, TAKE ONE TABLET BY MOUTH EVERY DAY, Disp: 30 tablet, Rfl: 3    valsartan-hydrochlorothiazide (DIOVAN-HCT) 320-25 mg per tablet, Take 1 tablet by mouth once daily., Disp: 30 tablet, Rfl: 0          Objective:   Vitals:  There were no vitals taken for this visit.    Physical Examination:   GEN: no apparent distress, comfortable  HEAD: atraumatic and normocephalic  EYES: no pallor, no icterus  ENT:  no pharyngeal erythema, external ears WNL; no nasal discharge  NECK: no masses, thyroid normal, trachea midline, no LAD/LN's, supple  CV: RRR with no murmur; normal pulse; normal S1 and S2  CHEST: Normal respiratory effort; CTAB; normal breath sounds; no wheeze or crackles  ABDOM: nontender and nondistended; soft, no rebound/guarding, L/S NP  MUSC/Skeletal: ROM normal; no crepitus; joints normal  EXTREM: no clubbing, cyanosis, inflammation or swelling  SKIN: no rashes, lesions, ulcers, petechiae   : no CVAT  NEURO: grossly intact; motor/sensory WNL;  no tremors  PSYCH: normal mood, affect and behavior  LYMPH: normal cervical, supraclavicular, axillary and groin  LN's  BREASTS: L & R NT, no palpable mass      Labs:     Hgb 9.4, creat 1.56, MCV 76, RDW 16.6., WBC 4400  plt cnt 141,000.  Creatine 1.39.  GFR 46.  Hct 30.1  Ferritin 262, folate 10.3, B 12 986., B6 28, Vit D 21.  Assessment:   (1) 65 y.o. female with diagnosis of  Anemia and pancytopenia.    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  Hct 30.1  Start procrit if  Hct < 30.    (2)RTC 3 months, with CBC, Hgb Elect.    [place] office.    Estimate 10-15 minutes on phone with audio visit today.                  Established Patient - Audio Only Telehealth Visit     Visit type: Virtual visit with audio only (telephone)     The reason for the audio only service rather than synchronous audio and video virtual visit was related to technical difficulties or patient preference/necessity.     Each patient to whom I provide medical services by telemedicine is:  (1) informed of the relationship between the physician and patient and the respective role of any other health care provider with respect to management of the patient; and (2) notified that they may decline to receive medical services by telemedicine and may withdraw from such care at any time. Patient verbally consented to receive this service via voice-only telephone call.       This service was not originating from a related E/M service provided within the previous 7 days nor will  to an E/M service or procedure within the next 24 hours or my soonest available appointment.  Prevailing standard of care was able to be met in this audio-only visit.

## 2020-07-30 ENCOUNTER — OFFICE VISIT (OUTPATIENT)
Dept: HEMATOLOGY/ONCOLOGY | Facility: CLINIC | Age: 65
End: 2020-07-30
Payer: MEDICARE

## 2020-07-30 VITALS
BODY MASS INDEX: 46.07 KG/M2 | DIASTOLIC BLOOD PRESSURE: 93 MMHG | HEART RATE: 87 BPM | SYSTOLIC BLOOD PRESSURE: 134 MMHG | TEMPERATURE: 98 F | WEIGHT: 293 LBS

## 2020-07-30 DIAGNOSIS — D50.0 IRON DEFICIENCY ANEMIA DUE TO CHRONIC BLOOD LOSS: Primary | ICD-10-CM

## 2020-07-30 DIAGNOSIS — N18.30 ANEMIA ASSOCIATED WITH STAGE 3 CHRONIC RENAL FAILURE: ICD-10-CM

## 2020-07-30 DIAGNOSIS — D63.1 ANEMIA ASSOCIATED WITH STAGE 3 CHRONIC RENAL FAILURE: ICD-10-CM

## 2020-07-30 PROCEDURE — 99214 OFFICE O/P EST MOD 30 MIN: CPT | Mod: S$GLB,,, | Performed by: INTERNAL MEDICINE

## 2020-07-30 PROCEDURE — 99214 PR OFFICE/OUTPT VISIT, EST, LEVL IV, 30-39 MIN: ICD-10-PCS | Mod: S$GLB,,, | Performed by: INTERNAL MEDICINE

## 2020-07-30 RX ORDER — AMMONIUM LACTATE 12 G/100G
CREAM TOPICAL
COMMUNITY

## 2020-07-30 RX ORDER — CICLOPIROX OLAMINE 7.7 MG/G
CREAM TOPICAL 2 TIMES DAILY
COMMUNITY

## 2020-07-30 RX ORDER — LOSARTAN POTASSIUM AND HYDROCHLOROTHIAZIDE 25; 100 MG/1; MG/1
1 TABLET ORAL DAILY
COMMUNITY

## 2020-07-30 RX ORDER — CALCITRIOL 0.25 UG/1
0.25 CAPSULE ORAL DAILY
COMMUNITY

## 2020-07-30 RX ORDER — TIZANIDINE HYDROCHLORIDE 4 MG/1
CAPSULE, GELATIN COATED ORAL
COMMUNITY

## 2020-07-30 RX ORDER — TRAZODONE HYDROCHLORIDE 100 MG/1
100 TABLET ORAL NIGHTLY
COMMUNITY

## 2020-08-01 NOTE — PROGRESS NOTES
[facility] Hematology Oncology In Office Encounter Follow Up Progress Note    20      Subjective:      Patient ID:   Mitzy Almonte  65 y.o. female  1955  [place], Kim Martines Lew, Yanez      Chief Complaint:   Anemia, pancytopenia    HPI:  65 y.o. female with anemia and pancytopenia.  No transfusion hx, liver or GB dx.  Energy 6/10.  Anabelle Stevenson, sister, anemia.    She has R wrist pain x's 6 weeks, wearing a wrist splint.    Has had episodes of L inguinal pain and vaginal spotting.  Dr. Leong did GYN eval, spotting sx resolved.    Decreased appetite but weight increased.  No GI sx.  She has sleep apnea, but does not use CPAP.  Fibromyalgia +, no RA.  Spinal stenosis with sciatica down both legs, and L & R foot numbness.  Imbalance sx.    Dr. Gonzalez sees her for renal dx.  Creat 1.38.  She has been taking Fe pill po daily.  No surgery hx.  HTN +.    Menses age 17.  M0  Postmenopausal in early 50's.  Admits to frequency, incontinence.    Smoke no. ETOH no. Allergy no.  Job [employer] manager.    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  Dad colostomy.  Sister anemia    Sister CHF  Son diverticulitis    EGD, colonoscopy [facility] 17.      ROS:   GEN: normal without any fever, night sweats or weight loss  HEENT: normal with no HA's, sore throat, stiff neck, changes in vision  CV: normal with no CP, SOB, PND, BARNES or orthopnea  PULM:See HPI  GI: normal with no abdominal pain, nausea, vomiting, constipation, diarrhea, melanotic stools, BRBPR, or hematemesis  : normal with no hematuria, dysuria  GYN: See HPI  BREAST: normal with no mass, discharge, pain  SKIN: normal with no rash, erythema, bruising, or swelling    Review of patient's allergies indicates:  No Known Allergies      Current Outpatient Medications:     ammonium lactate 12 % Crea, Apply topically., Disp: , Rfl:     aspirin-calcium carbonate 81 mg-300 mg calcium(777 mg) Tab, Take 81 mg by mouth., Disp: , Rfl:     calcitRIOL (ROCALTROL) 0.25 MCG  Cap, Take 0.25 mcg by mouth once daily., Disp: , Rfl:     carvedilol (COREG) 12.5 MG tablet, carvedilol 12.5 mg tablet  TAKE ONE TABLET BY MOUTH TWICE DAILY, Disp: , Rfl:     ciclopirox (LOPROX) 0.77 % Crea, Apply topically 2 (two) times daily., Disp: , Rfl:     DULoxetine (CYMBALTA) 60 MG capsule, Take 60 mg by mouth., Disp: , Rfl:     ferrous sulfate (FEOSOL) 325 mg (65 mg iron) Tab tablet, Take 325 mg by mouth., Disp: , Rfl:     gabapentin (NEURONTIN) 400 MG capsule, Take 400 mg by mouth 3 (three) times daily., Disp: , Rfl: 5    HYDROcodone-acetaminophen (NORCO)  mg per tablet, Take 1 tablet by mouth., Disp: , Rfl:     losartan-hydrochlorothiazide 100-25 mg (HYZAAR) 100-25 mg per tablet, Take 1 tablet by mouth once daily., Disp: , Rfl:     meloxicam (MOBIC) 15 MG tablet, Take 15 mg by mouth once daily., Disp: , Rfl: 3    tiZANidine 4 mg Cap, Take by mouth., Disp: , Rfl:     traZODone (DESYREL) 100 MG tablet, Take 100 mg by mouth every evening., Disp: , Rfl:     cyanocobalamin, vitamin B-12, 1,000 mcg TbSR, Take by mouth., Disp: , Rfl:     furosemide (LASIX) 20 MG tablet, Take 20 mg by mouth., Disp: , Rfl:     glucosamine sulfate (SYNOVACIN ORAL), Glucosamine, Disp: , Rfl:     methocarbamol (ROBAXIN) 500 MG Tab, Take 1,000 mg by mouth., Disp: , Rfl:     pantoprazole (PROTONIX) 40 MG tablet, TAKE ONE TABLET BY MOUTH EVERY DAY (Patient not taking: Reported on 7/30/2020), Disp: 30 tablet, Rfl: 3    valsartan-hydrochlorothiazide (DIOVAN-HCT) 320-25 mg per tablet, Take 1 tablet by mouth once daily., Disp: 30 tablet, Rfl: 0          Objective:   Vitals:  Blood pressure (!) 134/93, pulse 87, temperature 97.9 °F (36.6 °C), weight (!) 141.5 kg (312 lb).    Physical Examination:   GEN: no apparent distress, comfortable  HEAD: atraumatic and normocephalic  EYES: no pallor, no icterus  ENT:  no pharyngeal erythema, external ears WNL; no nasal discharge  NECK: no masses, thyroid normal, trachea midline,  no LAD/LN's, supple  CV: RRR with no murmur; normal pulse; normal S1 and S2  CHEST: Normal respiratory effort; CTAB; normal breath sounds; no wheeze or crackles  ABDOM: nontender and nondistended; soft, no rebound/guarding, L/S NP  MUSC/Skeletal: ROM normal; no crepitus; joints normal  EXTREM: no clubbing, cyanosis, inflammation or swelling  SKIN: no rashes, lesions, ulcers, petechiae   : no CVAT  NEURO: grossly intact; motor/sensory WNL;  no tremors  PSYCH: normal mood, affect and behavior  LYMPH: normal cervical, supraclavicular, axillary and groin LN's  BREASTS: L & R NT, no palpable mass      Labs:     Hgb 9.4, to 9.6., creat 1.38, MCV 79, RDW 16.6., WBC 7400  plt cnt 150,000.  Creatine 1.39.  GFR 46.  Hct 31.7.  Ferritin 170, folate 10.3, B 12 986., B6 28, Vit D 21.    Assessment:   (1) 65 y.o. female with diagnosis of  Anemia and pancytopenia.    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  She is on oral Fe daily.  For now, Hgb better at 9.6, Hct better at 31.7, ferritin , WBC and plt cnt NL.    (2)RTC 3 months, with CBC, Hgb Elect.    [place] office.  If Hct < 30, begin Retacrit weekly support.    RTC and CBC 3 months.

## 2020-10-27 NOTE — PROGRESS NOTES
[facility] Hematology Oncology In Office Encounter Follow Up Progress Note    2020      Subjective:      Patient ID:   Mitzy Almonte  65 y.o. female  1955  Deandre, Kim Martines Lew, Yanez      Chief Complaint:   Anemia, pancytopenia    HPI:    She admits to fatigue and shortness of breath with exertion.  Energy is 3/10.  65 y.o. female with anemia and pancytopenia.  No transfusion hx, liver or GB dx.  Energy 6/10.  Anabelle Stevenson, sister, anemia.    She has R wrist pain x's 6 weeks, wearing a wrist splint.    Has had episodes of L inguinal pain and vaginal spotting.  Dr. Leong did GYN eval, spotting sx resolved.    Decreased appetite but weight increased.  No GI sx.  She has sleep apnea, but does not use CPAP.  Fibromyalgia +, no RA.  Spinal stenosis with sciatica down both legs, and L & R foot numbness.  Imbalance sx.    Dr. Gonzalez sees her for renal dx.  Creat 1.38.  She has been taking Fe pill po daily.  No surgery hx.  HTN +.    Menses age 17.  M0  Postmenopausal in early 50's.  Admits to frequency, incontinence.    Smoke no. ETOH no. Allergy no.  Job [employer] manager.    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  Dad colostomy.  Sister anemia    Sister CHF  Son diverticulitis    EGD, colonoscopy [facility].      ROS:   GEN: normal without any fever, night sweats or weight loss  HEENT: normal with no HA's, sore throat, stiff neck, changes in vision  CV: normal with no CP, SOB, PND, BARNES or orthopnea  PULM:See HPI  GI: normal with no abdominal pain, nausea, vomiting, constipation, diarrhea, melanotic stools, BRBPR, or hematemesis  : normal with no hematuria, dysuria  GYN: See HPI  BREAST: normal with no mass, discharge, pain  SKIN: normal with no rash, erythema, bruising, or swelling    Review of patient's allergies indicates:  No Known Allergies      Current Outpatient Medications:     ammonium lactate 12 % Crea, Apply topically., Disp: , Rfl:     aspirin-calcium carbonate 81 mg-300 mg  calcium(777 mg) Tab, Take 81 mg by mouth., Disp: , Rfl:     calcitRIOL (ROCALTROL) 0.25 MCG Cap, Take 0.25 mcg by mouth once daily., Disp: , Rfl:     carvedilol (COREG) 12.5 MG tablet, carvedilol 12.5 mg tablet  TAKE ONE TABLET BY MOUTH TWICE DAILY, Disp: , Rfl:     ciclopirox (LOPROX) 0.77 % Crea, Apply topically 2 (two) times daily., Disp: , Rfl:     cyanocobalamin, vitamin B-12, 1,000 mcg TbSR, Take by mouth., Disp: , Rfl:     DULoxetine (CYMBALTA) 60 MG capsule, Take 60 mg by mouth., Disp: , Rfl:     ferrous sulfate (FEOSOL) 325 mg (65 mg iron) Tab tablet, Take 325 mg by mouth., Disp: , Rfl:     furosemide (LASIX) 20 MG tablet, Take 20 mg by mouth., Disp: , Rfl:     gabapentin (NEURONTIN) 400 MG capsule, Take 400 mg by mouth 3 (three) times daily., Disp: , Rfl: 5    glucosamine sulfate (SYNOVACIN ORAL), Glucosamine, Disp: , Rfl:     HYDROcodone-acetaminophen (NORCO)  mg per tablet, Take 1 tablet by mouth., Disp: , Rfl:     losartan-hydrochlorothiazide 100-25 mg (HYZAAR) 100-25 mg per tablet, Take 1 tablet by mouth once daily., Disp: , Rfl:     meloxicam (MOBIC) 15 MG tablet, Take 15 mg by mouth once daily., Disp: , Rfl: 3    methocarbamol (ROBAXIN) 500 MG Tab, Take 1,000 mg by mouth., Disp: , Rfl:     pantoprazole (PROTONIX) 40 MG tablet, TAKE ONE TABLET BY MOUTH EVERY DAY (Patient not taking: Reported on 7/30/2020), Disp: 30 tablet, Rfl: 3    tiZANidine 4 mg Cap, Take by mouth., Disp: , Rfl:     traZODone (DESYREL) 100 MG tablet, Take 100 mg by mouth every evening., Disp: , Rfl:     valsartan-hydrochlorothiazide (DIOVAN-HCT) 320-25 mg per tablet, Take 1 tablet by mouth once daily., Disp: 30 tablet, Rfl: 0          Objective:   Vitals:  Blood pressure (!) 170/93, pulse 64, temperature 98.3 °F (36.8 °C), resp. rate 19, weight (!) 143.7 kg (316 lb 12.8 oz).    Physical Examination:   GEN: no apparent distress, comfortable  HEAD: atraumatic and normocephalic  EYES: no pallor, no icterus  ENT:   no pharyngeal erythema, external ears WNL; no nasal discharge  NECK: no masses, thyroid normal, trachea midline, no LAD/LN's, supple  CV: RRR with no murmur; normal pulse; normal S1 and S2  CHEST: Normal respiratory effort; CTAB; normal breath sounds; no wheeze or crackles  ABDOM: nontender and nondistended; soft, no rebound/guarding, L/S NP  MUSC/Skeletal: ROM normal; no crepitus; joints normal  EXTREM: no clubbing, cyanosis, inflammation or swelling  SKIN: no rashes, lesions, ulcers, petechiae   : no CVAT  NEURO: grossly intact; motor/sensory WNL;  no tremors  PSYCH: normal mood, affect and behavior  LYMPH: normal cervical, supraclavicular, axillary and groin LN's  BREASTS: L & R NT, no palpable mass      Labs:     Hgb 9.4, to 9.6., creat 1.38, MCV 79, RDW 16.6., WBC 7400  plt cnt 150,000.  Creatine 1.39.  GFR 46.  Hct 31.7.  Ferritin 170, folate 10.3, B 12 986., B6 28, Vit D 21.    Anemia has not improved on iron supplementation.  This anemia is secondary to renal insufficiency.  She would benefit from a trial of Procrit or Retacrit.  Hemoglobin is 8.1, we would titrate weekly injection for hemoglobin of 10.  Ferritin is 231.    Assessment:   (1) 65 y.o. female with diagnosis of  Anemia and pancytopenia.    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  She is on oral Fe daily.     (2)RTC 3 months, with CBC, Hgb Elect. [facility] office.  Hct < 30, begin Retacrit weekly support now through [facility]    RTC and CBC 3 months.  [place] office.

## 2020-10-31 PROBLEM — N18.9 ANEMIA IN CHRONIC RENAL DISEASE: Status: ACTIVE | Noted: 2020-01-01

## 2020-10-31 PROBLEM — D63.1 ANEMIA IN CHRONIC RENAL DISEASE: Status: ACTIVE | Noted: 2020-01-01

## 2020-10-31 NOTE — TELEPHONE ENCOUNTER
She has anemia and chronic renal disease.  Orders placed in epic for Retacrit weekly at [facility] and CBC q.2 weeks.    Can you walk this through and set it up for her at [place].    She returns to clinic to see me in the [place] office in 3 months.

## 2020-11-23 NOTE — TELEPHONE ENCOUNTER
----- Message from Kaye Blanchard sent at 11/19/2020 10:03 AM CST -----  The patient has a sprained ankle and cannot go to [facility] to get her injection. They want to know if she can get it at home. Please call her sister Anabelle back at [phone] or cell [phone].

## 2020-12-14 NOTE — TELEPHONE ENCOUNTER
----- Message from Hortensia Lomeli, Patient Care Assistant sent at 12/11/2020  3:39 PM CST -----  Patient called in stating she would like to speak to someone regarding her Procrit shot. She can be reached at [phone]

## 2021-01-01 ENCOUNTER — OFFICE VISIT (OUTPATIENT)
Dept: HEMATOLOGY/ONCOLOGY | Facility: CLINIC | Age: 66
End: 2021-01-01
Payer: MEDICARE

## 2021-01-01 ENCOUNTER — TELEPHONE (OUTPATIENT)
Dept: HEMATOLOGY/ONCOLOGY | Facility: CLINIC | Age: 66
End: 2021-01-01

## 2021-01-01 ENCOUNTER — DOCUMENT SCAN (OUTPATIENT)
Dept: HOME HEALTH SERVICES | Facility: HOSPITAL | Age: 66
End: 2021-01-01

## 2021-01-01 VITALS
RESPIRATION RATE: 18 BRPM | DIASTOLIC BLOOD PRESSURE: 71 MMHG | TEMPERATURE: 96 F | SYSTOLIC BLOOD PRESSURE: 96 MMHG | HEART RATE: 79 BPM

## 2021-01-01 VITALS
RESPIRATION RATE: 18 BRPM | SYSTOLIC BLOOD PRESSURE: 95 MMHG | DIASTOLIC BLOOD PRESSURE: 70 MMHG | HEART RATE: 78 BPM | WEIGHT: 275 LBS | TEMPERATURE: 99 F | BODY MASS INDEX: 40.61 KG/M2

## 2021-01-01 DIAGNOSIS — D63.1 ANEMIA IN STAGE 3A CHRONIC KIDNEY DISEASE: Primary | ICD-10-CM

## 2021-01-01 DIAGNOSIS — N18.31 ANEMIA IN STAGE 3A CHRONIC KIDNEY DISEASE: Primary | ICD-10-CM

## 2021-01-01 DIAGNOSIS — N18.30 ANEMIA ASSOCIATED WITH STAGE 3 CHRONIC RENAL FAILURE: ICD-10-CM

## 2021-01-01 DIAGNOSIS — D63.1 ANEMIA ASSOCIATED WITH STAGE 3 CHRONIC RENAL FAILURE: ICD-10-CM

## 2021-01-01 PROCEDURE — 99214 OFFICE O/P EST MOD 30 MIN: CPT | Mod: S$GLB,,, | Performed by: INTERNAL MEDICINE

## 2021-01-01 PROCEDURE — 99214 PR OFFICE/OUTPT VISIT, EST, LEVL IV, 30-39 MIN: ICD-10-PCS | Mod: S$GLB,,, | Performed by: INTERNAL MEDICINE

## 2021-10-20 ENCOUNTER — TELEPHONE (OUTPATIENT)
Dept: HEMATOLOGY/ONCOLOGY | Facility: CLINIC | Age: 66
End: 2021-10-20